# Patient Record
Sex: MALE | Race: WHITE | NOT HISPANIC OR LATINO | Employment: FULL TIME | URBAN - METROPOLITAN AREA
[De-identification: names, ages, dates, MRNs, and addresses within clinical notes are randomized per-mention and may not be internally consistent; named-entity substitution may affect disease eponyms.]

---
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
## 2020-07-31 ENCOUNTER — HOSPITAL ENCOUNTER (EMERGENCY)
Facility: CLINIC | Age: 20
Discharge: HOME | End: 2020-07-31
Attending: NURSE PRACTITIONER | Admitting: NURSE PRACTITIONER
Payer: COMMERCIAL

## 2020-07-31 ENCOUNTER — APPOINTMENT (OUTPATIENT)
Dept: GENERAL RADIOLOGY | Facility: CLINIC | Age: 20
End: 2020-07-31
Attending: NURSE PRACTITIONER
Payer: COMMERCIAL

## 2020-07-31 VITALS
WEIGHT: 145 LBS | TEMPERATURE: 98.3 F | SYSTOLIC BLOOD PRESSURE: 124 MMHG | RESPIRATION RATE: 16 BRPM | HEART RATE: 99 BPM | DIASTOLIC BLOOD PRESSURE: 78 MMHG | OXYGEN SATURATION: 99 % | BODY MASS INDEX: 21.41 KG/M2

## 2020-07-31 DIAGNOSIS — K52.9 NON-SPECIFIC COLITIS: ICD-10-CM

## 2020-07-31 LAB
ALBUMIN SERPL-MCNC: 4.8 G/DL (ref 3.4–5)
ALP SERPL-CCNC: 102 U/L (ref 40–150)
ALT SERPL W P-5'-P-CCNC: 33 U/L (ref 0–70)
ANION GAP SERPL CALCULATED.3IONS-SCNC: 4 MMOL/L (ref 3–14)
AST SERPL W P-5'-P-CCNC: 15 U/L (ref 0–45)
BASOPHILS # BLD AUTO: 0.1 10E9/L (ref 0–0.2)
BASOPHILS NFR BLD AUTO: 0.9 %
BILIRUB SERPL-MCNC: 0.6 MG/DL (ref 0.2–1.3)
BUN SERPL-MCNC: 10 MG/DL (ref 7–30)
CALCIUM SERPL-MCNC: 9.1 MG/DL (ref 8.5–10.1)
CHLORIDE SERPL-SCNC: 106 MMOL/L (ref 94–109)
CO2 SERPL-SCNC: 30 MMOL/L (ref 20–32)
CREAT SERPL-MCNC: 0.8 MG/DL (ref 0.66–1.25)
DIFFERENTIAL METHOD BLD: NORMAL
EOSINOPHIL # BLD AUTO: 0.2 10E9/L (ref 0–0.7)
EOSINOPHIL NFR BLD AUTO: 3.4 %
ERYTHROCYTE [DISTWIDTH] IN BLOOD BY AUTOMATED COUNT: 11.9 % (ref 10–15)
GFR SERPL CREATININE-BSD FRML MDRD: >90 ML/MIN/{1.73_M2}
GLUCOSE SERPL-MCNC: 105 MG/DL (ref 70–99)
HCT VFR BLD AUTO: 46.6 % (ref 40–53)
HEMOCCULT STL QL: YES
HGB BLD-MCNC: 16.6 G/DL (ref 13.3–17.7)
IMM GRANULOCYTES # BLD: 0 10E9/L (ref 0–0.4)
IMM GRANULOCYTES NFR BLD: 0.1 %
INTERNAL QC OK POCT: YES
LYMPHOCYTES # BLD AUTO: 1.9 10E9/L (ref 0.8–5.3)
LYMPHOCYTES NFR BLD AUTO: 28.6 %
MCH RBC QN AUTO: 32.2 PG (ref 26.5–33)
MCHC RBC AUTO-ENTMCNC: 35.6 G/DL (ref 31.5–36.5)
MCV RBC AUTO: 90 FL (ref 78–100)
MONOCYTES # BLD AUTO: 0.5 10E9/L (ref 0–1.3)
MONOCYTES NFR BLD AUTO: 7.9 %
NEUTROPHILS # BLD AUTO: 4 10E9/L (ref 1.6–8.3)
NEUTROPHILS NFR BLD AUTO: 59.1 %
NRBC # BLD AUTO: 0 10*3/UL
NRBC BLD AUTO-RTO: 0 /100
PLATELET # BLD AUTO: 235 10E9/L (ref 150–450)
POTASSIUM SERPL-SCNC: 3.5 MMOL/L (ref 3.4–5.3)
PROT SERPL-MCNC: 7.9 G/DL (ref 6.8–8.8)
RBC # BLD AUTO: 5.16 10E12/L (ref 4.4–5.9)
SODIUM SERPL-SCNC: 140 MMOL/L (ref 133–144)
TEST CARD LOT NUMBER: ABNORMAL
WBC # BLD AUTO: 6.7 10E9/L (ref 4–11)

## 2020-07-31 PROCEDURE — 80053 COMPREHEN METABOLIC PANEL: CPT | Performed by: NURSE PRACTITIONER

## 2020-07-31 PROCEDURE — 74019 RADEX ABDOMEN 2 VIEWS: CPT

## 2020-07-31 PROCEDURE — 99284 EMERGENCY DEPT VISIT MOD MDM: CPT | Performed by: NURSE PRACTITIONER

## 2020-07-31 PROCEDURE — 85025 COMPLETE CBC W/AUTO DIFF WBC: CPT | Performed by: NURSE PRACTITIONER

## 2020-07-31 PROCEDURE — 82272 OCCULT BLD FECES 1-3 TESTS: CPT | Performed by: NURSE PRACTITIONER

## 2020-07-31 PROCEDURE — 99284 EMERGENCY DEPT VISIT MOD MDM: CPT | Mod: Z6 | Performed by: NURSE PRACTITIONER

## 2020-07-31 NOTE — ED AVS SNAPSHOT
[facility]  [address] [facility] [facility] [address]  Phone:  [phone]  Fax:  [phone]                                    Pio Watson   MRN: [id number]    Department:  [facility]   Date of Visit:  7/31/2020           After Visit Summary Signature Page    I have received my discharge instructions, and my questions have been answered. I have discussed any challenges I see with this plan with the nurse or doctor.    ..........................................................................................................................................  Patient/Patient Representative Signature      ..........................................................................................................................................  Patient Representative Print Name and Relationship to Patient    ..................................................               ................................................  Date                                   Time    ..........................................................................................................................................  Reviewed by Signature/Title    ...................................................              ..............................................  Date                                               Time          22EPIC Rev 08/18

## 2020-07-31 NOTE — LETTER
July 31, 2020      To Whom It May Concern:      Pio Watson was seen in our Emergency Department today, 07/31/20.  I expect his condition to improve over the next few days.  He may return to work when improved.    Sincerely,        FELIPA Pal CNP

## 2020-07-31 NOTE — ED PROVIDER NOTES
History     Chief Complaint   Patient presents with     Rectal Bleeding     bright red blood has been constipated     HPI  Pio Watson is a 20 year old male who presents to the emergency department with abdominal pain and bright red rectal bleeding.  Pt reports onset of symptoms was Wednesday.  Pt reports he had [employer] on Wednesday and within 20 minutes developed abdo melonie discomfort since  Patient endorses associative symptoms of nausea and chills; patient thought that perhaps he was constipated and therefore he tried taking a laxative without any change in bowel movements or his symptomatology.    Patient denies fever, aches, sweats, ear pain, eye pain, throat pain, vision changes, chest pain, shortness of air, vomiting, diarrhea, difficulty urinating, dysuria, dizziness, seizures, speech difficulty and thoughts of harming self.  All other systems were reviewed and are negative.    Allergies:  No Known Allergies    Problem List:    Patient Active Problem List    Diagnosis Date Noted     Nonorganic enuresis 10/06/2008     Priority: Medium     Other behavioral problems 08/07/2007     Priority: Medium     Problem list name updated by automated process. 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 Provider to review          Past Medical History:    Past Medical History:   Diagnosis Date     Unspecified hearing loss        Past Surgical History:    No past surgical history on file.    Family History:    Family History   Problem Relation Age of Onset     Depression Mother      Gastrointestinal Disease Mother         Crohn's Disease     Alcohol/Drug Father      Unknown/Adopted Father      Unknown/Adopted Maternal Grandfather      Musculoskeletal Disorder Paternal Grandmother         fibromyalgia     Diabetes Paternal Grandmother         hypoglycemia       Social History:  Marital Status:  Single [1]  Social History      Tobacco Use     Smoking status: Never Smoker     Smokeless tobacco: Never Used     Tobacco comment: outside, car   Substance Use Topics     Alcohol use: No     Drug use: No          Medications:    No current outpatient medications on file.        Review of Systems  As mentioned above in the history present illness. All other systems were reviewed and are negative.    Physical Exam   BP: 121/79  Pulse: 99  Heart Rate: 80  Temp: 98.3  F (36.8  C)  Resp: 16  Weight: 65.8 kg (145 lb)  SpO2: 97 %      Physical Exam  Vitals signs and nursing note reviewed.   Constitutional:       General: He is not in acute distress (mild distress).     Appearance: Normal appearance. He is well-developed. He is not ill-appearing, toxic-appearing or diaphoretic.   HENT:      Head: Normocephalic and atraumatic.      Right Ear: Tympanic membrane, ear canal and external ear normal.      Left Ear: Tympanic membrane, ear canal and external ear normal.      Nose: Nose normal.      Mouth/Throat:      Mouth: Mucous membranes are moist.      Pharynx: Uvula midline. No oropharyngeal exudate or posterior oropharyngeal erythema.   Eyes:      General:         Right eye: No discharge.         Left eye: No discharge.      Conjunctiva/sclera: Conjunctivae normal.   Neck:      Thyroid: No thyromegaly.   Cardiovascular:      Rate and Rhythm: Normal rate and regular rhythm.      Heart sounds: Normal heart sounds. No murmur. No friction rub. No gallop.    Pulmonary:      Effort: Pulmonary effort is normal.      Breath sounds: Normal breath sounds. No stridor. No wheezing.   Abdominal:      General: Abdomen is protuberant. Bowel sounds are normal. There is no distension.      Palpations: Abdomen is soft. There is no mass.      Tenderness: There is generalized abdominal tenderness. There is no right CVA tenderness, left CVA tenderness, guarding or rebound.      Hernia: There is no hernia in the umbilical area or ventral area.   Lymphadenopathy:       Cervical: No cervical adenopathy.   Skin:     General: Skin is warm.      Capillary Refill: Capillary refill takes less than 2 seconds.      Findings: No rash.   Neurological:      General: No focal deficit present.      Mental Status: He is alert and oriented to person, place, and time.   Psychiatric:         Mood and Affect: Mood normal.         Behavior: Behavior is cooperative.         ED Course        Procedures    Results for orders placed or performed during the hospital encounter of 07/31/20 (from the past 24 hour(s))   Occult blood stool POCT   Result Value Ref Range    Occult Blood yes neg    Internal QC OK Yes     Test Card Lot Number [id number]    CBC with platelets differential   Result Value Ref Range    WBC 6.7 4.0 - 11.0 10e9/L    RBC Count 5.16 4.4 - 5.9 10e12/L    Hemoglobin 16.6 13.3 - 17.7 g/dL    Hematocrit 46.6 40.0 - 53.0 %    MCV 90 78 - 100 fl    MCH 32.2 26.5 - 33.0 pg    MCHC 35.6 31.5 - 36.5 g/dL    RDW 11.9 10.0 - 15.0 %    Platelet Count 235 150 - 450 10e9/L    Diff Method Automated Method     % Neutrophils 59.1 %    % Lymphocytes 28.6 %    % Monocytes 7.9 %    % Eosinophils 3.4 %    % Basophils 0.9 %    % Immature Granulocytes 0.1 %    Nucleated RBCs 0 0 /100    Absolute Neutrophil 4.0 1.6 - 8.3 10e9/L    Absolute Lymphocytes 1.9 0.8 - 5.3 10e9/L    Absolute Monocytes 0.5 0.0 - 1.3 10e9/L    Absolute Eosinophils 0.2 0.0 - 0.7 10e9/L    Absolute Basophils 0.1 0.0 - 0.2 10e9/L    Abs Immature Granulocytes 0.0 0 - 0.4 10e9/L    Absolute Nucleated RBC 0.0    Comprehensive metabolic panel   Result Value Ref Range    Sodium 140 133 - 144 mmol/L    Potassium 3.5 3.4 - 5.3 mmol/L    Chloride 106 94 - 109 mmol/L    Carbon Dioxide 30 20 - 32 mmol/L    Anion Gap 4 3 - 14 mmol/L    Glucose 105 (H) 70 - 99 mg/dL    Urea Nitrogen 10 7 - 30 mg/dL    Creatinine 0.80 0.66 - 1.25 mg/dL    GFR Estimate >90 >60 mL/min/[1.73_m2]    GFR Estimate If Black >90 >60 mL/min/[1.73_m2]    Calcium 9.1 8.5 - 10.1 mg/dL     Bilirubin Total 0.6 0.2 - 1.3 mg/dL    Albumin 4.8 3.4 - 5.0 g/dL    Protein Total 7.9 6.8 - 8.8 g/dL    Alkaline Phosphatase 102 40 - 150 U/L    ALT 33 0 - 70 U/L    AST 15 0 - 45 U/L   Abdomen, flat/upright (2 views)    Narrative    XR ABDOMEN 2 VW 7/31/2020 11:05 AM    HISTORY: abdominal pain, bright red rectal bleeding    COMPARISON: None.    FINDINGS: There is a normal bowel gas pattern without evidence for  small bowel obstruction. Normal amount of stool within the colon. No  free intraperitoneal air. No abnormal calcifications.      Impression    IMPRESSION: Unremarkable abdominal x-ray.    TISHA JOHNSON MD       Medications - No data to display    Assessments & Plan (with Medical Decision Making)  Pio Watson is a 20 year old male who presents to the emergency department with abdominal pain and bright red rectal bleeding.  Patient reports that he noted his symptoms after eating [employer] on Wednesday and subsequently has had abdominal pain, cramping, and intermittent bright red rectal bleeding.  Patient has no known history of external or internal hemorrhoids and no history of diverticulitis, ulcerative colitis or Crohn's disease.  Patient does report mother has Crohn's disease but he himself does not.  On examination patient has no signs of acute abdomen and vital signs reveal blood pressure be 124/78, temp is 98.3, heart rate is 80, O2 saturation rate is 99%.  X-ray of the abdomen reveals normal bowel gas pattern with no sign of obstruction or perforation.  CBC completed and no sign of acute leukocytosis or acute blood loss as hemoglobin is 16.6.  Stool guaiac is positive and rectal exam reveals small scant amounts of bright red blood.  Comprehensive metabolic panel is normal.  Reviewed all these findings with patient and recommend a gastroenteritis that can be related to food possibly.  Discussed worrisome reasons with which returned.  Recommend clear liquid to bland diet and follow-up if no  improvement in symptoms or persistent bleeding beyond 7 to 8 days.  Return sooner if worsening pain.  Patient verbalized understanding and discharged in stable condition.     I have reviewed the nursing notes.    I have reviewed the findings, diagnosis, plan and need for follow up with the patient.  There are no discharge medications for this patient.      Final diagnoses:   Non-specific colitis       7/31/2020   [facility] EMERGENCY DEPARTMENT     Octavia Smith APRN CNP  07/31/20 1727

## 2020-07-31 NOTE — ED NOTES
3 days ago had [employer], took some laxatives no results last normal BM on Tuesday, rectal pressure, blood in stool last night, no emesis no diarrhea

## 2020-12-04 ENCOUNTER — VIRTUAL VISIT (OUTPATIENT)
Dept: FAMILY MEDICINE | Facility: OTHER | Age: 20
End: 2020-12-04
Payer: COMMERCIAL

## 2020-12-04 PROCEDURE — 99421 OL DIG E/M SVC 5-10 MIN: CPT | Performed by: PHYSICIAN ASSISTANT

## 2020-12-04 NOTE — PROGRESS NOTES
"Date: 2020 11:42:19  Clinician: Ike Pagan  Clinician NPI: [id number]  Patient: Brennen Watson  Patient : 2000  Patient Address: [address] [id number]  Patient Phone: [phone]  Visit Protocol: URI  Patient Summary:  Brennen is a 20 year old ( : 2000 ) male who initiated a [facility] Visit for COVID-19 (Coronavirus) evaluation and screening. When asked the question \"Please sign me up to receive news, health information and promotions from [facility].\", Brennen responded \"Yes\".    Brennen states his symptoms started gradually 3-4 days ago.   His symptoms consist of wheezing, a cough, nasal congestion, rhinitis, myalgia, malaise, a sore throat, ageusia, diarrhea, and anosmia. Brennen also feels feverish but was unable to measure his temperature.   Symptom details     Nasal secretions: The color of his mucus is clear.    Cough: Brennen coughs a few times an hour and his cough is more bothersome at night. Phlegm comes into his throat when he coughs. He believes his cough is caused by post-nasal drip. The color of the phlegm is clear.     Sore throat: Brennen reports having mild throat pain (1-3 on a 10 point pain scale), does not have exudate on his tonsils, and can swallow liquids. He is not sure if the lymph nodes in his neck are enlarged. A rash has not appeared on the skin since the sore throat started.     Wheezing: Additional wheezing details as reported by the patient (free text): It hasn't really affected me but it is there        Brennen denies having ear pain, headache, nausea, vomiting, facial pain or pressure, chills, and teeth pain. He also denies taking antibiotic medication in the past month, having recent facial or sinus surgery in the past 60 days, and double sickening (worsening symptoms after initial improvement). He is not experiencing dyspnea.   Precipitating events  Within the past week, Brennen has not been exposed to someone with strep throat. He has recently been exposed to " someone with influenza. Brennen has been in close contact with the following high risk individuals: people with asthma, heart disease or diabetes.   Pertinent COVID-19 (Coronavirus) information  Brennen does not work or volunteer as healthcare worker or a . In the past 14 days, Brennen has not worked or volunteered at a healthcare facility or group living setting.   In the past 14 days, he also has not lived in a congregate living setting.   Brennen has had a close contact with a laboratory-confirmed COVID-19 patient within 14 days of symptom onset. He was exposed at his work. Date Brennen was exposed to the laboratory-confirmed COVID-19 patient: 2000   Additional information about contact with COVID-19 (Coronavirus) patient as reported by the patient (free text): My co-worker wasn't feeling well at work. He was saying he lost his taste and couldn't smell. And now I have very similar symptoms he has    Since December 2019, Brennen has not been tested for COVID-19 and has had upper respiratory infection (URI) or influenza-like illness.      Date(s) of previous URI or influenza-like illness (free-text): Last year around this time     Symptoms Brennen experienced during previous URI or influenza-like illness as reported by the patient (free-text): Fever, Runny nose, chills.        Pertinent medical history  He has not been told by his provider to avoid NSAIDs.   Brennen does not have diabetes. He denies having immunosuppressive conditions (e.g., chemotherapy, HIV, organ transplant, long-term use of steroids or other immunosuppressive medications, splenectomy). He does not have severe COPD and congestive heart failure. He does not have asthma.   Brennen needs a return to work/school note.   Weight: 145 lbs   Brennen smokes or uses smokeless tobacco.   Weight: 145 lbs    MEDICATIONS: No current medications, ALLERGIES: NKDA  Clinician Response:  Dear Brennen,   Based on your exposure to COVID-19 (coronavirus), we would like to test  you for this virus.  1. Please call [phone] to schedule your visit. Explain that you were referred by [facility] to have a COVID-19 test. Be ready to share your [facility] visit ID number.  * If you need to schedule in [facility] please call [phone] or for [employer] employees please call [phone].   * If you need to schedule in the [place] area please call [phone]. [place] employees call [phone].   The following will serve as your written order for this COVID Test, ordered by me, for the indication of suspected COVID [Z20.828]: The test will be ordered in [employer], our electronic health record, after you are scheduled. It will show as ordered and authorized by Judson Yancey MD.  Order: COVID-19 (coronavirus) PCR for ASYMPTOMATIC EXPOSURE testing from [facility].   If you know you have had close contact with someone who tested positive, you should be quarantined for 14 days after this exposure. You should stay in quarantine for the14 days even if the covid test is negative, the optimal time to test after exposure is 5-7 days from the exposure  Quarantine means   What should I do?  For safety, it's very important to follow these rules. Do this for 14 days after the date you were last exposed to the virus..  Stay home and away from others. Don't go to school or anywhere else. Generally quarantine means staying home from work but there are some exceptions to this. Please contact your workplace.   No hugging, kissing or shaking hands.  Don't let anyone visit.  Cover your mouth and nose with a mask, tissue or washcloth to avoid spreading germs.  Wash your hands and face often. Use soap and water.  What are the symptoms of COVID-19?  The most common symptoms are cough, fever and trouble breathing. Less common symptoms include headache, body aches, fatigue (feeling very tired), chills, sore throat, stuffy or runny nose, diarrhea (loose poop), loss of taste or smell, belly pain, and nausea or vomiting (feeling sick to  your stomach or throwing up).  After 14 days, if you have still don't have symptoms, you likely don't have this virus.  If you develop symptoms, follow these guidelines.  If you're normally healthy: Please start another [facility] visit to report your symptoms. Go to [email or web address].  If you have a serious health problem (like cancer, heart failure, an organ transplant or kidney disease): Call your specialty clinic. Let them know that you might have COVID-19.  2. When it's time for your COVID test:  Stay at least 6 feet away from others. (If someone will drive you to your test, stay in the backseat, as far away from the  as you can.)  Cover your mouth and nose with a mask, tissue or washcloth.  Go straight to the testing site. Don't make any stops on the way there or back.  Please note  Caregivers in these groups are at risk for severe illness due to COVID-19:  o People 65 years and older  o People who live in a nursing home or long-term care facility  o People with chronic disease (lung, heart, cancer, diabetes, kidney, liver, immunologic)  o People who have a weakened immune system, including those who:  Are in cancer treatment  Take medicine that weakens the immune system, such as corticosteroids  Had a bone marrow or organ transplant  Have an immune deficiency  Have poorly controlled HIV or AIDS  Are obese (body mass index of 40 or higher)  Smoke regularly  Where can I get more information?  [facility] -- About COVID-19: [email or web address]  CDC -- What to Do If You're Sick: www.cdc.gov/coronavirus/2019-ncov/about/steps-when-sick.html  CDC -- Ending Home Isolation: www.cdc.gov/coronavirus/2019-ncov/hcp/disposition-in-home-patients.html  CDC -- Caring for Someone: www.cdc.gov/coronavirus/2019-ncov/if-you-are-sick/care-for-someone.html  [facility] -- Interim Guidance for Hospital Discharge to Home: www.health.[facility].[place]/diseases/coronavirus/hcp/hospdischarge.pdf  [facility] clinical trials  (COVID-19 research studies): [email or web address].[email or web address]-clinical-trials  Below are the COVID-19 hotlines at the [place] Department of Health ([facility]). Interpreters are available.  For health questions: Call 992-110-0398 or 1-119.928.1396 (7 a.m. to 7 p.m.)  For questions about schools and childcare: Call 224-993-0429 or 1-886.312.9136 (7 a.m. to 7 p.m.)    Diagnosis: Contact with and (suspected) exposure to other viral communicable diseases  Diagnosis ICD: Z20.828

## 2020-12-05 DIAGNOSIS — Z20.822 ENCOUNTER FOR LABORATORY TESTING FOR COVID-19 VIRUS: Primary | ICD-10-CM

## 2020-12-05 PROCEDURE — U0003 INFECTIOUS AGENT DETECTION BY NUCLEIC ACID (DNA OR RNA); SEVERE ACUTE RESPIRATORY SYNDROME CORONAVIRUS 2 (SARS-COV-2) (CORONAVIRUS DISEASE [COVID-19]), AMPLIFIED PROBE TECHNIQUE, MAKING USE OF HIGH THROUGHPUT TECHNOLOGIES AS DESCRIBED BY CMS-2020-01-R: HCPCS | Performed by: FAMILY MEDICINE

## 2020-12-06 ENCOUNTER — HEALTH MAINTENANCE LETTER (OUTPATIENT)
Age: 20
End: 2020-12-06

## 2020-12-06 LAB
SARS-COV-2 RNA SPEC QL NAA+PROBE: ABNORMAL
SPECIMEN SOURCE: ABNORMAL

## 2021-09-25 ENCOUNTER — HEALTH MAINTENANCE LETTER (OUTPATIENT)
Age: 21
End: 2021-09-25

## 2022-01-15 ENCOUNTER — HEALTH MAINTENANCE LETTER (OUTPATIENT)
Age: 22
End: 2022-01-15

## 2023-01-07 ENCOUNTER — HEALTH MAINTENANCE LETTER (OUTPATIENT)
Age: 23
End: 2023-01-07

## 2023-04-22 ENCOUNTER — HEALTH MAINTENANCE LETTER (OUTPATIENT)
Age: 23
End: 2023-04-22

## 2023-06-08 ENCOUNTER — APPOINTMENT (OUTPATIENT)
Dept: GENERAL RADIOLOGY | Facility: CLINIC | Age: 23
End: 2023-06-08
Attending: EMERGENCY MEDICINE
Payer: OTHER MISCELLANEOUS

## 2023-06-08 ENCOUNTER — HOSPITAL ENCOUNTER (EMERGENCY)
Facility: CLINIC | Age: 23
Discharge: HOME | End: 2023-06-09
Attending: EMERGENCY MEDICINE | Admitting: EMERGENCY MEDICINE
Payer: OTHER MISCELLANEOUS

## 2023-06-08 DIAGNOSIS — S61.214A LACERATION OF RIGHT RING FINGER WITHOUT FOREIGN BODY WITHOUT DAMAGE TO NAIL, INITIAL ENCOUNTER: ICD-10-CM

## 2023-06-08 PROCEDURE — 99283 EMERGENCY DEPT VISIT LOW MDM: CPT | Mod: 25 | Performed by: EMERGENCY MEDICINE

## 2023-06-08 PROCEDURE — 12002 RPR S/N/AX/GEN/TRNK2.6-7.5CM: CPT | Performed by: EMERGENCY MEDICINE

## 2023-06-08 PROCEDURE — 73140 X-RAY EXAM OF FINGER(S): CPT | Mod: RT

## 2023-06-08 PROCEDURE — 250N000011 HC RX IP 250 OP 636: Performed by: EMERGENCY MEDICINE

## 2023-06-08 PROCEDURE — 90715 TDAP VACCINE 7 YRS/> IM: CPT | Performed by: EMERGENCY MEDICINE

## 2023-06-08 PROCEDURE — 90471 IMMUNIZATION ADMIN: CPT | Performed by: EMERGENCY MEDICINE

## 2023-06-08 RX ADMIN — CLOSTRIDIUM TETANI TOXOID ANTIGEN (FORMALDEHYDE INACTIVATED), CORYNEBACTERIUM DIPHTHERIAE TOXOID ANTIGEN (FORMALDEHYDE INACTIVATED), BORDETELLA PERTUSSIS TOXOID ANTIGEN (GLUTARALDEHYDE INACTIVATED), BORDETELLA PERTUSSIS FILAMENTOUS HEMAGGLUTININ ANTIGEN (FORMALDEHYDE INACTIVATED), BORDETELLA PERTUSSIS PERTACTIN ANTIGEN, AND BORDETELLA PERTUSSIS FIMBRIAE 2/3 ANTIGEN 0.5 ML: 5; 2; 2.5; 5; 3; 5 INJECTION, SUSPENSION INTRAMUSCULAR at 23:55

## 2023-06-09 ENCOUNTER — TELEPHONE (OUTPATIENT)
Dept: FAMILY MEDICINE | Facility: CLINIC | Age: 23
End: 2023-06-09

## 2023-06-09 VITALS
TEMPERATURE: 99 F | HEART RATE: 87 BPM | HEIGHT: 70 IN | RESPIRATION RATE: 16 BRPM | DIASTOLIC BLOOD PRESSURE: 84 MMHG | OXYGEN SATURATION: 97 % | SYSTOLIC BLOOD PRESSURE: 136 MMHG | BODY MASS INDEX: 22.19 KG/M2 | WEIGHT: 155 LBS

## 2023-06-09 RX ORDER — CEPHALEXIN 500 MG/1
500 CAPSULE ORAL 4 TIMES DAILY
Qty: 40 CAPSULE | Refills: 0 | Status: SHIPPED | OUTPATIENT
Start: 2023-06-09

## 2023-06-09 NOTE — TELEPHONE ENCOUNTER
Forms/Letter Request    Type of form/letter: Work    Have you been seen for this request: Yes In ER 6/8/2023    Do we have the form/letter: No    Who is the form from? Work Patient is missing work today 6/9/2023 due to finger laceration.     When is form/letter needed by: n/a    How would you like the form/letter returned: [facility]    Patient Notified form requests are processed in 3-5 business days:Yes    Could we send this information to you in [facility] or would you prefer to receive a phone call?:   Patient would prefer a phone call   Okay to leave a detailed message?: Yes at Home number on file [phone] (home)

## 2023-06-09 NOTE — LETTER
June 12, 2023      Pio Watson  [address] [address]        To Whom It May Concern:    Pio Watson was seen in the emergency department and missed work 6/8/23.     Sincerely,      FELIPA Quezada CNP

## 2023-06-09 NOTE — ED PROVIDER NOTES
History     Chief Complaint   Patient presents with     Hand Injury     Right ring finger caught in injection mold- laceration     HPI  Pio Watson is a 23 year old male who is right-hand dominant, presenting the emergency department with right ring finger injury.  On the volar aspect, patient had hand caught in injection bold press, causing laceration over the palmar surface.  No injury elsewhere.  Does have right ring finger injury from years ago.  Has permanent/old deviation of the right small finger.  No other injury.  Tetanus updated today in the emergency department    Allergies:  No Known Allergies    Problem List:    Patient Active Problem List    Diagnosis Date Noted     Nonorganic enuresis 10/06/2008     Priority: Medium     Other behavioral problems 08/07/2007     Priority: Medium     Problem list name updated by automated process. 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 Provider to review          Past Medical History:    Past Medical History:   Diagnosis Date     Unspecified hearing loss        Past Surgical History:    No past surgical history on file.    Family History:    Family History   Problem Relation Age of Onset     Depression Mother      Gastrointestinal Disease Mother         Crohn's Disease     Alcohol/Drug Father      Unknown/Adopted Father      Unknown/Adopted Maternal Grandfather      Musculoskeletal Disorder Paternal Grandmother         fibromyalgia     Diabetes Paternal Grandmother         hypoglycemia       Social History:  Marital Status:  Single [1]  Social History     Tobacco Use     Smoking status: Never     Smokeless tobacco: Never     Tobacco comments:     outside, car   Substance Use Topics     Alcohol use: No     Drug use: No        Medications:    cephALEXin (KEFLEX) 500 MG capsule          Review of Systems  See HPI  Physical Exam   BP: (!) 137/96  Pulse: 91  Temp: 99  F  "(37.2  C)  Resp: 16  Height: 177.8 cm (5' 10\")  Weight: 70.3 kg (155 lb)  SpO2: 98 %      Physical Exam  BP (!) 137/96   Pulse 91   Temp 99  F (37.2  C) (Oral)   Resp 16   Ht 1.778 m (5' 10\")   Wt 70.3 kg (155 lb)   SpO2 98%   BMI 22.24 kg/m    General: alert and in no acute distress  Head: atraumatic, normocephalic  Abd: nondistended  Musculoskel/Extremities: Right hand with chronic appearing change of right small finger with ulnar deviation of the PCP.  The fourth finger of the right hand has approximately 4 cm laceration over the volar aspect.  No trouble with range of motion and normal flexion at the DIP and PIP.  Skin: no rashes, no diaphoresis and skin color normal  Neuro: Patient awake, alert, oriented, speech is fluent, gait is normal  Psychiatric: affect/mood normal, cooperative, normal judgement/insight and memory intact      ED Course                 Procedures  [facility] Procedure Note        Laceration Repair:    Performed by: Reuben Jules MD  Authorized by: Reuben Jules MD  Consent given by: Patient who states understanding of the procedure being performed after discussing the risks, benefits and alternatives.    Preparation: Patient was prepped and draped in usual sterile fashion.  Irrigation solution: saline    Body area:right ring finer  Laceration length: 4cm  Contamination: The wound is not contaminated.  Foreign bodies:none  Tendon involvement: none  Anesthesia: Digital block  Local anesthetic: Bupivacaine 0.5%  Anesthetic total: 4ml    Debridement: none  Skin closure: Closed with with 9 x  4.0 Vicryl Sutures  Technique: interrupted  Approximation: close  Approximation difficulty: simple    Patient tolerance: Patient tolerated the procedure well with no immediate complications.              Critical Care time:  none               Results for orders placed or performed during the hospital encounter of 06/08/23 (from the past 24 hour(s))   XR Finger Right G/E 2 Views    " Narrative    EXAM: XR FINGER RIGHT G/E 2 VIEWS  LOCATION: [facility]  DATE/TIME: 6/8/2023 11:55 PM CDT    INDICATION: Caught in injection mold. Pain, trauma.  COMPARISON: None.      Impression    IMPRESSION: Lateral deviation of the 5th digit at the PIP joint. Associated soft tissue swelling and mild deformity. No fracture or dislocation.         Medications   Tdap (tetanus-diphtheria-acell pertussis) (ADACEL) injection 0.5 mL (0.5 mLs Intramuscular $Given 6/8/23 0682)       Assessments & Plan (with Medical Decision Making)  23 year old male, right-hand-dominant, presenting the emergency department after hand was caught in injection mold.  Patient was wearing gloves.  Suffered laceration.  X-ray images are performed.  X-ray images are personally reviewed, and no evidence of fracture based on my interpretation of x-ray.  Laceration was cleansed, and repaired as documented above.  Absorbable sutures were utilized.  I did prescribe Keflex and patient to start this in the event any redness develops given his work in an overall dirty environment.  Instructed on wound care and follow-up in clinic if any signs of infection develop.     I have reviewed the nursing notes.    I have reviewed the findings, diagnosis, plan and need for follow up with the patient.               New Prescriptions    CEPHALEXIN (KEFLEX) 500 MG CAPSULE    Take 1 capsule (500 mg) by mouth 4 times daily       Final diagnoses:   Laceration of right ring finger without foreign body without damage to nail, initial encounter       6/8/2023   [facility] EMERGENCY DEPT     Reuben Jules MD  06/09/23 0035

## 2023-06-09 NOTE — TELEPHONE ENCOUNTER
06-08-23 ED visit, work related finger lac/ repair. S  States mentioned needing note for anticipated missed work today but did not get. States is , lac on dominant hand. Next work day scheduled  06-13-23, plans to return to work without restrictions.  Pt requesting work excuse note from primary care for missed work today. Last OV 11-18-16 Rocio Tracy.  Forwarded to Rocio for review, recommendations.  Advised may need appt as this is work comp and has not seen primary care for some time.   If note approved, fax to [phone], estelle Ashraf.  SUSSY Lucia RN

## 2023-06-09 NOTE — DISCHARGE INSTRUCTIONS
Be seen if any signs of infection develop such as redness, pus, swelling.    Begin antibiotics if signs of infection develop, and would recommend follow-up in clinic if that does occur.    Dissolving stitches were utilized.  9 stitches will disappear on their own.

## 2023-06-12 NOTE — TELEPHONE ENCOUNTER
I sent a letter via [employer] that he was seen in the ED and missed work on that day. If he needs something else, he should be seen    FELIPA Quezada CNP

## 2023-06-14 ENCOUNTER — OFFICE VISIT (OUTPATIENT)
Dept: FAMILY MEDICINE | Facility: CLINIC | Age: 23
End: 2023-06-14

## 2023-06-14 VITALS
RESPIRATION RATE: 16 BRPM | OXYGEN SATURATION: 96 % | HEART RATE: 90 BPM | SYSTOLIC BLOOD PRESSURE: 120 MMHG | HEIGHT: 70 IN | DIASTOLIC BLOOD PRESSURE: 80 MMHG | WEIGHT: 153 LBS | TEMPERATURE: 97.9 F | BODY MASS INDEX: 21.9 KG/M2

## 2023-06-14 DIAGNOSIS — S61.214D LACERATION OF RIGHT RING FINGER WITHOUT FOREIGN BODY WITHOUT DAMAGE TO NAIL, SUBSEQUENT ENCOUNTER: Primary | ICD-10-CM

## 2023-06-14 PROCEDURE — 99024 POSTOP FOLLOW-UP VISIT: CPT | Performed by: NURSE PRACTITIONER

## 2023-06-14 ASSESSMENT — PAIN SCALES - GENERAL: PAINLEVEL: MODERATE PAIN (4)

## 2023-06-14 NOTE — PROGRESS NOTES
"  Assessment & Plan     Laceration of right ring finger without foreign body without damage to nail, subsequent encounter  Healing laceration.  Some concern for infection however Pio reports symptoms unchanged, recommend starting prescribed antibiotics. Note provided with restrictions for work. Recheck in 1 week if symptoms continuing to need restrictions.        MED REC REQUIRED  Post Medication Reconciliation Status: discharge medications reconciled, continue medications without change    FELIPA Quezada CNP  [facility]    Dipti Suero is a 23 year old, presenting for the following health issues:  ER F/U        6/14/2023     1:41 PM   Additional Questions   Roomed by Emily VILLAGRAN     ED/UC Followup:    Facility:  [facility]  Date of visit: 6/8/2023  Reason for visit: Laceration to right ring finger   Current Status: pt state was not given any restrictions for ED. Is unable to push, pull or lift with right hand.   Did not start taking Keflex.       Review of Systems   Constitutional, HEENT, cardiovascular, pulmonary, gi and gu systems are negative, except as otherwise noted.      Objective    /80 (Cuff Size: Adult Regular)   Pulse 90   Temp 97.9  F (36.6  C) (Tympanic)   Resp 16   Ht 1.778 m (5' 10\")   Wt 69.4 kg (153 lb)   SpO2 96%   BMI 21.95 kg/m    Body mass index is 21.95 kg/m .  Physical Exam   GENERAL: healthy, alert and no distress  SKIN: Healing incision right ring finger with swelling, warmth and erythema present, tenderness present lateral right ring finger, range of motion limited at DIP joint  PSYCH: mentation appears normal, affect normal/bright            "

## 2023-06-14 NOTE — LETTER
June 14, 2023      Pio Watson  [address]  [place] [address]        To Whom It May Concern:    Pio Watson was seen on 6/14/23.  Please excuse him due to follow up from work related injury.        Sincerely,        FELIPA Quezada CNP

## 2023-06-14 NOTE — LETTER
June 14, 2023      Pio Watson  [address]  [place] [facility] [address]        To Whom It May Concern:    Pio Watson was seen in our clinic. He may return to work with the following: limited to light duty - No use of right ring finger until 6/22/23 recheck.       Sincerely,        FELIPA Quezada CNP

## 2023-06-22 ENCOUNTER — OFFICE VISIT (OUTPATIENT)
Dept: FAMILY MEDICINE | Facility: CLINIC | Age: 23
End: 2023-06-22
Payer: OTHER MISCELLANEOUS

## 2023-06-22 VITALS
OXYGEN SATURATION: 96 % | DIASTOLIC BLOOD PRESSURE: 80 MMHG | TEMPERATURE: 97 F | BODY MASS INDEX: 21.73 KG/M2 | RESPIRATION RATE: 22 BRPM | WEIGHT: 151.8 LBS | SYSTOLIC BLOOD PRESSURE: 106 MMHG | HEIGHT: 70 IN | HEART RATE: 84 BPM

## 2023-06-22 DIAGNOSIS — S61.214D LACERATION OF RIGHT RING FINGER WITHOUT FOREIGN BODY WITHOUT DAMAGE TO NAIL, SUBSEQUENT ENCOUNTER: Primary | ICD-10-CM

## 2023-06-22 PROCEDURE — 99212 OFFICE O/P EST SF 10 MIN: CPT | Performed by: NURSE PRACTITIONER

## 2023-06-22 ASSESSMENT — PAIN SCALES - GENERAL: PAINLEVEL: MILD PAIN (2)

## 2023-06-22 NOTE — PROGRESS NOTES
"  Assessment & Plan     Laceration of right ring finger without foreign body without damage to nail, subsequent encounter  Having some nerve pain at distal end of laceration otherwise wound healing well.  Plan to monitor at this time.  Note provided to return to work without restrictions.       FELIPA Quezada CNP  [facility]    Dipti Suero is a 23 year old, presenting for the following health issues:  Work Comp (Laceration of right ring finger.  Has been taking Cephalexin.  He states the last stitch by his knuckle is very sore to touch or if he hits it on something.  Otherwise feeling much better. )        6/22/2023    11:06 AM   Additional Questions   Roomed by Chayo HILTON     History of Present Illness       Reason for visit:  Injury to ring finger  Symptom onset:  1-2 weeks ago    He eats 0-1 servings of fruits and vegetables daily.He consumes 2 sweetened beverage(s) daily.He exercises with enough effort to increase his heart rate 10 to 19 minutes per day.  He exercises with enough effort to increase his heart rate 3 or less days per week.   He is taking medications regularly.       Chief Complaint   Patient presents with     Work Comp     Laceration of right ring finger.  Has been taking Cephalexin.  He states the last stitch by his knuckle is very sore to touch or if he hits it on something.  Otherwise feeling much better.      Burning when stitch is moved    Review of Systems   Constitutional, HEENT, cardiovascular, pulmonary, gi and gu systems are negative, except as otherwise noted.      Objective    /80   Pulse 84   Temp 97  F (36.1  C) (Tympanic)   Resp 22   Ht 1.778 m (5' 10\")   Wt 68.9 kg (151 lb 12.8 oz)   SpO2 96%   BMI 21.78 kg/m    Body mass index is 21.78 kg/m .  Physical Exam   GENERAL: healthy, alert and no distress  SKIN: healed laceration right ring finger  PSYCH: mentation appears normal, affect normal/bright                "

## 2023-06-22 NOTE — LETTER
June 22, 2023      Pio Watson  [address]  [place] [address]        To Whom It May Concern:    Pio Watson was seen in our clinic. He may return to work without restrictions 6/23/23.      Sincerely,        FELIPA Quezada CNP

## 2024-06-29 ENCOUNTER — HEALTH MAINTENANCE LETTER (OUTPATIENT)
Age: 24
End: 2024-06-29

## 2024-07-12 NOTE — DISCHARGE INSTRUCTIONS
I recommend a bland diet and plenty of clear liquids until your symptoms are completely resolved.  Then slowly progress to a regular diet.  Return if you should develop lightheadedness, dizziness worsening abdominal pain.       Patient has safety precautions in place bed in the lowest position, bed alarm on, and call light within reach. Plan of care ongoing. No further concerns as of present.    Problem: Patient Centered Care  Goal: Patient preferences are identified and integrated in the patient's plan of care  Description: Interventions:    - Provide timely, complete, and accurate information to patient/family  - Incorporate patient and family knowledge, values, beliefs, and cultural backgrounds into the planning and delivery of care  - Encourage patient/family to participate in care and decision-making at the level they choose  - Honor patient and family perspectives and choices  Outcome: Progressing  Problem: PAIN - ADULT  Goal: Verbalizes/displays adequate comfort level or patient's stated pain goal  Description: INTERVENTIONS:  - Encourage pt to monitor pain and request assistance  - Assess pain using appropriate pain scale  - Administer analgesics based on type and severity of pain and evaluate response  - Implement non-pharmacological measures as appropriate and evaluate response  - Consider cultural and social influences on pain and pain management  - Manage/alleviate anxiety  - Utilize distraction and/or relaxation techniques  - Monitor for opioid side effects  - Notify MD/LIP if interventions unsuccessful or patient reports new pain  - Anticipate increased pain with activity and pre-medicate as appropriate  Outcome: Progressing     Problem: RISK FOR INFECTION - ADULT  Goal: Absence of fever/infection during anticipated neutropenic period  Description: INTERVENTIONS  - Monitor WBC  - Administer growth factors as ordered  - Implement neutropenic guidelines  Outcome: Progressing     Problem: SAFETY ADULT - FALL  Goal: Free from fall injury  Description: INTERVENTIONS:  - Assess pt frequently for physical needs  - Identify cognitive and physical deficits and behaviors that affect risk of falls.  - Hailey fall precautions as  indicated by assessment.  - Educate pt/family on patient safety including physical limitations  - Instruct pt to call for assistance with activity based on assessment  - Modify environment to reduce risk of injury  - Provide assistive devices as appropriate  - Consider OT/PT consult to assist with strengthening/mobility  - Encourage toileting schedule  Outcome: Progressing     Problem: DISCHARGE PLANNING  Goal: Discharge to home or other facility with appropriate resources  Description: INTERVENTIONS:  - Identify barriers to discharge w/pt and caregiver  - Include patient/family/discharge partner in discharge planning  - Arrange for needed discharge resources and transportation as appropriate  - Identify discharge learning needs (meds, wound care, etc)  - Arrange for interpreters to assist at discharge as needed  - Consider post-discharge preferences of patient/family/discharge partner  - Complete POLST form as appropriate  - Assess patient's ability to be responsible for managing their own health  - Refer to Case Management Department for coordinating discharge planning if the patient needs post-hospital services based on physician/LIP order or complex needs related to functional status, cognitive ability or social support system  Outcome: Progressing

## 2025-05-15 ENCOUNTER — NURSE TRIAGE (OUTPATIENT)
Dept: FAMILY MEDICINE | Facility: CLINIC | Age: 25
End: 2025-05-15

## 2025-05-15 ENCOUNTER — APPOINTMENT (OUTPATIENT)
Dept: CT IMAGING | Facility: CLINIC | Age: 25
End: 2025-05-15
Attending: EMERGENCY MEDICINE
Payer: COMMERCIAL

## 2025-05-15 ENCOUNTER — HOSPITAL ENCOUNTER (EMERGENCY)
Facility: CLINIC | Age: 25
Discharge: HOME | End: 2025-05-15
Attending: EMERGENCY MEDICINE | Admitting: EMERGENCY MEDICINE
Payer: COMMERCIAL

## 2025-05-15 ENCOUNTER — APPOINTMENT (OUTPATIENT)
Dept: MRI IMAGING | Facility: CLINIC | Age: 25
End: 2025-05-15
Attending: EMERGENCY MEDICINE
Payer: COMMERCIAL

## 2025-05-15 VITALS
TEMPERATURE: 98.4 F | SYSTOLIC BLOOD PRESSURE: 128 MMHG | WEIGHT: 159 LBS | DIASTOLIC BLOOD PRESSURE: 85 MMHG | OXYGEN SATURATION: 98 % | HEIGHT: 69 IN | BODY MASS INDEX: 23.55 KG/M2 | RESPIRATION RATE: 6 BRPM | HEART RATE: 82 BPM

## 2025-05-15 DIAGNOSIS — R20.0 NUMBNESS: ICD-10-CM

## 2025-05-15 LAB
ANION GAP SERPL CALCULATED.3IONS-SCNC: 12 MMOL/L (ref 7–15)
APTT PPP: 27 SECONDS (ref 22–38)
ATRIAL RATE - MUSE: 76 BPM
BASOPHILS # BLD AUTO: 0.1 10E3/UL (ref 0–0.2)
BASOPHILS NFR BLD AUTO: 1 %
BUN SERPL-MCNC: 9.9 MG/DL (ref 6–20)
CALCIUM SERPL-MCNC: 9.6 MG/DL (ref 8.8–10.4)
CHLORIDE SERPL-SCNC: 105 MMOL/L (ref 98–107)
CREAT SERPL-MCNC: 0.92 MG/DL (ref 0.67–1.17)
DIASTOLIC BLOOD PRESSURE - MUSE: NORMAL MMHG
EGFRCR SERPLBLD CKD-EPI 2021: >90 ML/MIN/1.73M2
EOSINOPHIL # BLD AUTO: 0.1 10E3/UL (ref 0–0.7)
EOSINOPHIL NFR BLD AUTO: 2 %
ERYTHROCYTE [DISTWIDTH] IN BLOOD BY AUTOMATED COUNT: 11.7 % (ref 10–15)
GLUCOSE BLDC GLUCOMTR-MCNC: 96 MG/DL (ref 70–99)
GLUCOSE SERPL-MCNC: 91 MG/DL (ref 70–99)
HCO3 SERPL-SCNC: 25 MMOL/L (ref 22–29)
HCT VFR BLD AUTO: 40.8 % (ref 40–53)
HGB BLD-MCNC: 14.8 G/DL (ref 13.3–17.7)
IMM GRANULOCYTES # BLD: 0 10E3/UL
IMM GRANULOCYTES NFR BLD: 0 %
INR PPP: 1.05 (ref 0.85–1.15)
INTERPRETATION ECG - MUSE: NORMAL
LYMPHOCYTES # BLD AUTO: 1.6 10E3/UL (ref 0.8–5.3)
LYMPHOCYTES NFR BLD AUTO: 24 %
MCH RBC QN AUTO: 32 PG (ref 26.5–33)
MCHC RBC AUTO-ENTMCNC: 36.3 G/DL (ref 31.5–36.5)
MCV RBC AUTO: 88 FL (ref 78–100)
MONOCYTES # BLD AUTO: 0.5 10E3/UL (ref 0–1.3)
MONOCYTES NFR BLD AUTO: 7 %
NEUTROPHILS # BLD AUTO: 4.4 10E3/UL (ref 1.6–8.3)
NEUTROPHILS NFR BLD AUTO: 65 %
NRBC # BLD AUTO: 0 10E3/UL
NRBC BLD AUTO-RTO: 0 /100
P AXIS - MUSE: 47 DEGREES
PLATELET # BLD AUTO: 236 10E3/UL (ref 150–450)
POTASSIUM SERPL-SCNC: 4 MMOL/L (ref 3.4–5.3)
PR INTERVAL - MUSE: 176 MS
PROTHROMBIN TIME: 13.6 SECONDS (ref 11.8–14.8)
QRS DURATION - MUSE: 96 MS
QT - MUSE: 358 MS
QTC - MUSE: 402 MS
R AXIS - MUSE: 45 DEGREES
RBC # BLD AUTO: 4.63 10E6/UL (ref 4.4–5.9)
SODIUM SERPL-SCNC: 142 MMOL/L (ref 135–145)
SYSTOLIC BLOOD PRESSURE - MUSE: NORMAL MMHG
T AXIS - MUSE: 43 DEGREES
TROPONIN T SERPL HS-MCNC: <6 NG/L
VENTRICULAR RATE- MUSE: 76 BPM
WBC # BLD AUTO: 6.7 10E3/UL (ref 4–11)

## 2025-05-15 PROCEDURE — 99285 EMERGENCY DEPT VISIT HI MDM: CPT | Mod: 25 | Performed by: EMERGENCY MEDICINE

## 2025-05-15 PROCEDURE — 85610 PROTHROMBIN TIME: CPT | Performed by: EMERGENCY MEDICINE

## 2025-05-15 PROCEDURE — 36415 COLL VENOUS BLD VENIPUNCTURE: CPT | Performed by: EMERGENCY MEDICINE

## 2025-05-15 PROCEDURE — 255N000002 HC RX 255 OP 636: Performed by: EMERGENCY MEDICINE

## 2025-05-15 PROCEDURE — 85004 AUTOMATED DIFF WBC COUNT: CPT | Performed by: EMERGENCY MEDICINE

## 2025-05-15 PROCEDURE — 99285 EMERGENCY DEPT VISIT HI MDM: CPT | Performed by: EMERGENCY MEDICINE

## 2025-05-15 PROCEDURE — 80048 BASIC METABOLIC PNL TOTAL CA: CPT | Performed by: EMERGENCY MEDICINE

## 2025-05-15 PROCEDURE — 93005 ELECTROCARDIOGRAM TRACING: CPT | Performed by: EMERGENCY MEDICINE

## 2025-05-15 PROCEDURE — 250N000011 HC RX IP 250 OP 636: Performed by: EMERGENCY MEDICINE

## 2025-05-15 PROCEDURE — 93010 ELECTROCARDIOGRAM REPORT: CPT | Performed by: EMERGENCY MEDICINE

## 2025-05-15 PROCEDURE — 82962 GLUCOSE BLOOD TEST: CPT

## 2025-05-15 PROCEDURE — 70450 CT HEAD/BRAIN W/O DYE: CPT | Mod: XU

## 2025-05-15 PROCEDURE — 96360 HYDRATION IV INFUSION INIT: CPT | Mod: 59 | Performed by: EMERGENCY MEDICINE

## 2025-05-15 PROCEDURE — 258N000003 HC RX IP 258 OP 636: Performed by: EMERGENCY MEDICINE

## 2025-05-15 PROCEDURE — 70553 MRI BRAIN STEM W/O & W/DYE: CPT

## 2025-05-15 PROCEDURE — 250N000009 HC RX 250: Performed by: EMERGENCY MEDICINE

## 2025-05-15 PROCEDURE — 70498 CT ANGIOGRAPHY NECK: CPT

## 2025-05-15 PROCEDURE — A9585 GADOBUTROL INJECTION: HCPCS | Performed by: EMERGENCY MEDICINE

## 2025-05-15 PROCEDURE — 96361 HYDRATE IV INFUSION ADD-ON: CPT | Performed by: EMERGENCY MEDICINE

## 2025-05-15 PROCEDURE — 85730 THROMBOPLASTIN TIME PARTIAL: CPT | Performed by: EMERGENCY MEDICINE

## 2025-05-15 PROCEDURE — 84484 ASSAY OF TROPONIN QUANT: CPT | Performed by: EMERGENCY MEDICINE

## 2025-05-15 RX ORDER — IOPAMIDOL 755 MG/ML
67 INJECTION, SOLUTION INTRAVASCULAR ONCE
Status: COMPLETED | OUTPATIENT
Start: 2025-05-15 | End: 2025-05-15

## 2025-05-15 RX ORDER — GADOBUTROL 604.72 MG/ML
7 INJECTION INTRAVENOUS ONCE
Status: COMPLETED | OUTPATIENT
Start: 2025-05-15 | End: 2025-05-15

## 2025-05-15 RX ADMIN — SODIUM CHLORIDE 100 ML: 9 INJECTION, SOLUTION INTRAVENOUS at 20:18

## 2025-05-15 RX ADMIN — IOPAMIDOL 67 ML: 755 INJECTION, SOLUTION INTRAVENOUS at 20:17

## 2025-05-15 RX ADMIN — SODIUM CHLORIDE 500 ML: 0.9 INJECTION, SOLUTION INTRAVENOUS at 19:53

## 2025-05-15 RX ADMIN — GADOBUTROL 7 ML: 604.72 INJECTION INTRAVENOUS at 20:01

## 2025-05-15 ASSESSMENT — ACTIVITIES OF DAILY LIVING (ADL)
ADLS_ACUITY_SCORE: 41

## 2025-05-15 ASSESSMENT — ENCOUNTER SYMPTOMS
PSYCHIATRIC NEGATIVE: 1
GASTROINTESTINAL NEGATIVE: 1
EYES NEGATIVE: 1
ALLERGIC/IMMUNOLOGIC NEGATIVE: 1
HEMATOLOGIC/LYMPHATIC NEGATIVE: 1
MUSCULOSKELETAL NEGATIVE: 1
CARDIOVASCULAR NEGATIVE: 1
ENDOCRINE NEGATIVE: 1
CONSTITUTIONAL NEGATIVE: 1
NUMBNESS: 1
RESPIRATORY NEGATIVE: 1

## 2025-05-15 ASSESSMENT — COLUMBIA-SUICIDE SEVERITY RATING SCALE - C-SSRS
1. IN THE PAST MONTH, HAVE YOU WISHED YOU WERE DEAD OR WISHED YOU COULD GO TO SLEEP AND NOT WAKE UP?: NO
6. HAVE YOU EVER DONE ANYTHING, STARTED TO DO ANYTHING, OR PREPARED TO DO ANYTHING TO END YOUR LIFE?: NO
2. HAVE YOU ACTUALLY HAD ANY THOUGHTS OF KILLING YOURSELF IN THE PAST MONTH?: NO

## 2025-05-15 NOTE — TELEPHONE ENCOUNTER
Nurse Triage SBAR    Is this a 2nd Level Triage? YES, LICENSED PRACTITIONER REVIEW IS REQUIRED    Situation: Patient called  to see what he should do about his symptoms.     Background: Patient stated he has been having issues with dizziness for the last week, he thought that he was dehydrated and had increased his water intake but it did not help.    Assessment: About 10-15 minutes ago patient had a right sided numbness and tingling that lasted about 15-20 seconds. From his head all the way to his toes. He was alone and was not able to see if he had facial drooping but stated it felt like his face was drooping slightly. He stated he was reaching out when this happened. His right leg now has a little bit of soreness, he isn't sure if he has any weakness but said he thinks he felt a little weakness. No speech issues noted. No headaches. He is going to have someone take him to the ED.     Given his week long dizziness that is not improved with fluid intake. RN is concerned for possible TIA or other neurologic issue.   Protocol Recommended Disposition:   Call ADS/Go to ED/UCC Now (Or To Office with PCP Approval)    Recommendation: patient will have someone take him to the ED. reviewed s/s to call 911. Patient understands.     Routed to provider    Does the patient meet one of the following criteria for ADS visit consideration? 16+ years old, with an [facility] PCP     TIP  Providers, please consider if this condition is appropriate for management at one of our Acute and Diagnostic Services sites.     If patient is a good candidate, please use dotphrase <dot>triageresponse and select Refer to ADS to document.      Reason for Disposition   Neurologic deficit that was brief (now gone), ANY of the following: * Weakness of the face, arm, or leg on one side of the body * Numbness of the face, arm, or leg on one side of the body * Loss of speech or garbled speech    Protocols used: Neurologic Deficit-A-[place]    Bertha Payne RN on  5/15/2025 at 5:20 PM

## 2025-05-15 NOTE — LETTER
May 15, 2025      To Whom It May Concern:      Pio Watson was seen in our Emergency Department today, 05/15/25.  Please excuse him from missing work due to needing to seek care.  If there is symptom recurrence or new concerns he may need to return to be reexamined.  His work note is valid until 5/19/25      Sincerely,            Anthony Major MD  Electronically signed

## 2025-05-15 NOTE — ED PROVIDER NOTES
History     Chief Complaint   Patient presents with    Numbness     Pt presents with concerns of right sided numbness, sudden onset at 1630 tonight. 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 It lasted approx 20 seconds. Pt denies any prior similar symptoms.      HPI  Pio Watson is a 25 year old male who presents for evaluation with report of right-sided numbness    Reviewed the medical record.  Visit and triage contact prior to ED presentation for care.  Patient arrived by car with his girlfriend Anton reported that earlier this afternoon around 4:30 PM while he was at work reaching for an item while working on a machine he developed sudden onset of numbness involving the face, arm and leg.  Patient is right-hand dominant.  The episode lasted about  20 seconds.  Patient reports there is no sensation of tingling or true weakness.  He reported no visual change no headache no speech difficulty patient reported that he has had no prior episodes of similar episodes and no personal or family history of stroke or seizure or brain malignancy..  Patient reported he spoke with his mother who encouraged him to come in to be evaluated.  His girlfriend who picked him up from work reported that he ambulating normally and that his speech has been normal patient has not had any recurrence of symptoms.    Allergies:  No Known Allergies    Problem List:    Patient Active Problem List    Diagnosis Date Noted    Nonorganic enuresis 10/06/2008     Priority: Medium    Other behavioral problems 08/07/2007     Priority: Medium     Problem list name updated by automated process. 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 Provider to review          Past Medical History:    Past Medical History:   Diagnosis Date    Depressive disorder     Unspecified hearing loss        Past Surgical History:    No past surgical  "history on file.    Family History:    Family History   Problem Relation Age of Onset    Depression Mother     Gastrointestinal Disease Mother         Crohn's Disease    Alcohol/Drug Father     Unknown/Adopted Father     Unknown/Adopted Maternal Grandfather     Musculoskeletal Disorder Paternal Grandmother         fibromyalgia    Diabetes Paternal Grandmother         hypoglycemia       Social History:  Marital Status:  Single [1]  Social History     Tobacco Use    Smoking status: Every Day     Current packs/day: 0.00     Types: Cigarettes    Smokeless tobacco: Never    Tobacco comments:     outside, car   Substance Use Topics    Alcohol use: Not Currently    Drug use: No        Medications:    cephALEXin (KEFLEX) 500 MG capsule          Review of Systems   Constitutional: Negative.    HENT: Negative.     Eyes: Negative.    Respiratory: Negative.     Cardiovascular: Negative.    Gastrointestinal: Negative.    Endocrine: Negative.    Genitourinary: Negative.    Musculoskeletal: Negative.    Skin: Negative.    Allergic/Immunologic: Negative.    Neurological:  Positive for numbness.   Hematological: Negative.    Psychiatric/Behavioral: Negative.     All other systems reviewed and are negative.      Physical Exam   BP: (!) 138/100  Pulse: 85  Temp: 98.4  F (36.9  C)  Resp: 13  Height: 175.3 cm (5' 9\")  Weight: 72.1 kg (159 lb)  SpO2: 99 %      Physical Exam  Constitutional:       General: He is not in acute distress.     Appearance: He is not ill-appearing, toxic-appearing or diaphoretic.   HENT:      Head: Normocephalic and atraumatic.      Nose: Nose normal.      Mouth/Throat:      Mouth: Mucous membranes are moist.   Eyes:      Extraocular Movements: Extraocular movements intact.      Pupils: Pupils are equal, round, and reactive to light.   Cardiovascular:      Rate and Rhythm: Normal rate and regular rhythm.   Pulmonary:      Effort: Pulmonary effort is normal. No respiratory distress.      Breath sounds: Normal " breath sounds. No wheezing or rales.   Musculoskeletal:         General: No swelling, tenderness, deformity or signs of injury.      Cervical back: Normal range of motion and neck supple.      Right lower leg: No edema.      Left lower leg: No edema.   Skin:     Capillary Refill: Capillary refill takes less than 2 seconds.      Coloration: Skin is not jaundiced or pale.      Findings: No bruising, erythema, lesion or rash.   Neurological:      General: No focal deficit present.      Mental Status: He is alert and oriented to person, place, and time.      Cranial Nerves: No cranial nerve deficit.      Sensory: No sensory deficit.      Motor: No weakness.      Coordination: Coordination normal.      Gait: Gait normal.      Deep Tendon Reflexes: Reflexes normal.   Psychiatric:         Mood and Affect: Mood normal.         Behavior: Behavior normal.         Thought Content: Thought content normal.         Judgment: Judgment normal.         ED Course        Procedures              EKG Interpretation:      Interpreted by Harjeet Major MD  Time reviewed: 1944  Symptoms at time of EKG: None   Rhythm: normal sinus   Rate: Normal  Axis: Normal  Ectopy: none  Conduction: normal  ST Segments/ T Waves: Non-specific ST-T wave changes  Q Waves: nonspecific  Comparison to prior: No old EKG available    Clinical Impression: no acute changes    Critical Care time:  none     None        ED medications:  Medications   sodium chloride 0.9% BOLUS 500 mL (500 mLs Intravenous $New Bag 5/15/25 1953)   iopamidol (ISOVUE-370) solution 67 mL (67 mLs Intravenous $Given 5/15/25 2017)   sodium chloride 0.9 % bag for CT scan flush (100 mLs As instructed $Given 5/15/25 2018)   gadobutrol (GADAVIST) injection 7 mL (7 mLs Intravenous $Given 5/15/25 2001)        ED Vitals:  Vitals:    05/15/25 1850 05/15/25 1859   BP: (!) 138/100    Pulse: 85    Resp: 13    Temp:  98.4  F (36.9  C)   SpO2: 99%    Weight:  72.1 kg (159 lb)   Height:  1.753 m  "(5' 9\")        ED labs and imaging:  Results for orders placed or performed during the hospital encounter of 05/15/25   CT Head w/o Contrast     Status: None    Narrative    EXAM: CT HEAD W/O CONTRAST, CTA HEAD NECK W CONTRAST  LOCATION: [facility] [place]  DATE: 5/15/2025    INDICATION: Acute neuro deficit, stroke, TIA suspected. Acute right-sided numbness (face, arm and leg) at 4.30 pm., resolved. Evaluate for acute intracranial process.  COMPARISON: MRI brain performed same day.  CONTRAST: 67 mL Isovue-370.  TECHNIQUE: Head and neck CT angiogram with IV contrast. Noncontrast head CT followed by axial helical CT images of the head and neck vessels obtained during the arterial phase of intravenous contrast administration. Axial 2D reconstructed images and   multiplanar 3D MIP reconstructed images of the head and neck vessels were performed by the technologist. Dose reduction techniques were used. All stenosis measurements made according to NASCET criteria unless otherwise specified.    FINDINGS:   NONCONTRAST HEAD CT:   INTRACRANIAL CONTENTS: No finding for intracranial hemorrhage, mass, or acute infarct. Ventricles, sulci, and cisterns are normal in appearance. Normal gray-white matter differentiation. No mass effect or midline shift.    Cerebellar tonsils are normally positioned. Sella is unremarkable for technique. Corpus callosum is normally formed.    VISUALIZED ORBITS/SINUSES/MASTOIDS: No intraorbital abnormality. Mild left sphenoid sinus mucosal thickening.    BONES/SOFT TISSUES: Calvarium is intact, without suspicious lytic or blastic foci.    HEAD CTA:  ANTERIOR CIRCULATION: Intracranial internal carotid arteries are normal in appearance. Duplicated left middle cerebral artery versus bifurcation and its origin. Right middle cerebral artery is normal in appearance. A1 segment of the right anterior   cerebral artery is diminutive with flow to the A2 and more distal segments supplied via " large-caliber left A1 segment.    POSTERIOR CIRCULATION: Intracranial vertebral arteries, basilar artery, and posterior cerebral arteries are normal in appearance. Vertebral arteries are balanced. Posterior communicating artery is not identified.     DURAL VENOUS SINUSES: No findings for dural venous sinus thrombosis.    NECK CTA:  RIGHT CAROTID: No measurable stenosis or dissection.    LEFT CAROTID: No measurable stenosis or dissection.    VERTEBRAL ARTERIES: No focal stenosis or dissection. Balanced vertebral arteries.    AORTIC ARCH: Visualized aortic arch and proximal great vessels are unremarkable.    NONVASCULAR STRUCTURES: Visualized lung apices are clear.      Impression    IMPRESSION:   HEAD CT:  1.  No finding for intracranial hemorrhage, mass, or acute infarct.    HEAD CTA:  1.  Developmental variation of [place] of [place] without vascular cutoff, aneurysm, or flow-limiting stenosis.    NECK CTA:  1.  No significant stenosis either cervical carotid or vertebral system. No findings for dissection.     CTA Head Neck with Contrast     Status: None    Narrative    EXAM: CT HEAD W/O CONTRAST, CTA HEAD NECK W CONTRAST  LOCATION: [facility]  DATE: 5/15/2025    INDICATION: Acute neuro deficit, stroke, TIA suspected. Acute right-sided numbness (face, arm and leg) at 4.30 pm., resolved. Evaluate for acute intracranial process.  COMPARISON: MRI brain performed same day.  CONTRAST: 67 mL Isovue-370.  TECHNIQUE: Head and neck CT angiogram with IV contrast. Noncontrast head CT followed by axial helical CT images of the head and neck vessels obtained during the arterial phase of intravenous contrast administration. Axial 2D reconstructed images and   multiplanar 3D MIP reconstructed images of the head and neck vessels were performed by the technologist. Dose reduction techniques were used. All stenosis measurements made according to NASCET criteria unless otherwise specified.    FINDINGS:    NONCONTRAST HEAD CT:   INTRACRANIAL CONTENTS: No finding for intracranial hemorrhage, mass, or acute infarct. Ventricles, sulci, and cisterns are normal in appearance. Normal gray-white matter differentiation. No mass effect or midline shift.    Cerebellar tonsils are normally positioned. Sella is unremarkable for technique. Corpus callosum is normally formed.    VISUALIZED ORBITS/SINUSES/MASTOIDS: No intraorbital abnormality. Mild left sphenoid sinus mucosal thickening.    BONES/SOFT TISSUES: Calvarium is intact, without suspicious lytic or blastic foci.    HEAD CTA:  ANTERIOR CIRCULATION: Intracranial internal carotid arteries are normal in appearance. Duplicated left middle cerebral artery versus bifurcation and its origin. Right middle cerebral artery is normal in appearance. A1 segment of the right anterior   cerebral artery is diminutive with flow to the A2 and more distal segments supplied via large-caliber left A1 segment.    POSTERIOR CIRCULATION: Intracranial vertebral arteries, basilar artery, and posterior cerebral arteries are normal in appearance. Vertebral arteries are balanced. Posterior communicating artery is not identified.     DURAL VENOUS SINUSES: No findings for dural venous sinus thrombosis.    NECK CTA:  RIGHT CAROTID: No measurable stenosis or dissection.    LEFT CAROTID: No measurable stenosis or dissection.    VERTEBRAL ARTERIES: No focal stenosis or dissection. Balanced vertebral arteries.    AORTIC ARCH: Visualized aortic arch and proximal great vessels are unremarkable.    NONVASCULAR STRUCTURES: Visualized lung apices are clear.      Impression    IMPRESSION:   HEAD CT:  1.  No finding for intracranial hemorrhage, mass, or acute infarct.    HEAD CTA:  1.  Developmental variation of [place] of [place] without vascular cutoff, aneurysm, or flow-limiting stenosis.    NECK CTA:  1.  No significant stenosis either cervical carotid or vertebral system. No findings for dissection.     MR  Brain w/o & w Contrast     Status: None    Narrative    EXAM: MR BRAIN W/O and W CONTRAST  LOCATION: [facility]  DATE: 5/15/2025    INDICATION: Acute neuro deficit, stroke suspected. Acute right-sided numbness (Face, arm and leg) at 4.30pm. Resolved. Evaluate for acute intracranial and/or vascular process.  COMPARISON: CTA [place] of [place] and neck performed same day.  CONTRAST: gadavist 7 mL  TECHNIQUE: Routine multiplanar multisequence head MRI without and with intravenous contrast.    FINDINGS:  INTRACRANIAL CONTENTS: There are no areas of abnormally restricted diffusion to suggest acute or subacute infarct. There are no areas of abnormal parenchymal susceptibility or abnormal enhancement.    Ventricles are normal in size. Normal gray-white matter differentiation. No mass effect or midline shift.    Cerebellar tonsils are normally positioned. Visualized upper cervical spinal cord, sella, and corpus callosum are normal in appearance. Major skull base vascular flow-voids are preserved.    OSSEOUS STRUCTURES/SOFT TISSUES: Visualized marrow space is unremarkable.     ORBITS: No abnormality accounting for technique.     SINUSES/MASTOIDS: Mild ethmoid, left sphenoid, and right greater than left maxillary sinus mucosal thickening. No air-fluid levels. Middle ear cavities and mastoid air cells are essentially clear.       Impression    IMPRESSION:  1.  No finding for intracranial hemorrhage, mass, or acute infarct.    2.  Paranasal sinus mucosal thickening. No air-fluid levels.   Glucose by meter     Status: Normal   Result Value Ref Range    GLUCOSE BY METER POCT 96 70 - 99 mg/dL   Basic metabolic panel     Status: Normal   Result Value Ref Range    Sodium 142 135 - 145 mmol/L    Potassium 4.0 3.4 - 5.3 mmol/L    Chloride 105 98 - 107 mmol/L    Carbon Dioxide (CO2) 25 22 - 29 mmol/L    Anion Gap 12 7 - 15 mmol/L    Urea Nitrogen 9.9 6.0 - 20.0 mg/dL    Creatinine 0.92 0.67 - 1.17 mg/dL    GFR  Estimate >90 >60 mL/min/1.73m2    Calcium 9.6 8.8 - 10.4 mg/dL    Glucose 91 70 - 99 mg/dL   INR     Status: Normal   Result Value Ref Range    INR 1.05 0.85 - 1.15    PT 13.6 11.8 - 14.8 Seconds   Partial thromboplastin time     Status: Normal   Result Value Ref Range    aPTT 27 22 - 38 Seconds   Troponin T, High Sensitivity     Status: Normal   Result Value Ref Range    Troponin T, High Sensitivity <6 <=22 ng/L   CBC with platelets and differential     Status: None   Result Value Ref Range    WBC Count 6.7 4.0 - 11.0 10e3/uL    RBC Count 4.63 4.40 - 5.90 10e6/uL    Hemoglobin 14.8 13.3 - 17.7 g/dL    Hematocrit 40.8 40.0 - 53.0 %    MCV 88 78 - 100 fL    MCH 32.0 26.5 - 33.0 pg    MCHC 36.3 31.5 - 36.5 g/dL    RDW 11.7 10.0 - 15.0 %    Platelet Count 236 150 - 450 10e3/uL    % Neutrophils 65 %    % Lymphocytes 24 %    % Monocytes 7 %    % Eosinophils 2 %    % Basophils 1 %    % Immature Granulocytes 0 %    NRBCs per 100 WBC 0 <1 /100    Absolute Neutrophils 4.4 1.6 - 8.3 10e3/uL    Absolute Lymphocytes 1.6 0.8 - 5.3 10e3/uL    Absolute Monocytes 0.5 0.0 - 1.3 10e3/uL    Absolute Eosinophils 0.1 0.0 - 0.7 10e3/uL    Absolute Basophils 0.1 0.0 - 0.2 10e3/uL    Absolute Immature Granulocytes 0.0 <=0.4 10e3/uL    Absolute NRBCs 0.0 10e3/uL   EKG 12-lead, tracing only     Status: None (Preliminary result)   Result Value Ref Range    Systolic Blood Pressure  mmHg    Diastolic Blood Pressure  mmHg    Ventricular Rate 76 BPM    Atrial Rate 76 BPM    KY Interval 176 ms    QRS Duration 96 ms     ms    QTc 402 ms    P Axis 47 degrees    R AXIS 45 degrees    T Axis 43 degrees    Interpretation ECG       Sinus rhythm  Normal ECG  No previous ECGs available     CBC with platelets differential     Status: None    Narrative    The following orders were created for panel order CBC with platelets differential.  Procedure                               Abnormality         Status                     ---------                                -----------         ------                     CBC with platelets and ...[[id number]]                      Final result                 Please view results for these tests on the individual orders.   CBC with Platelets & Differential *Canceled*     Status: None ()    Narrative    The following orders were created for panel order CBC with Platelets & Differential.  Procedure                               Abnormality         Status                     ---------                               -----------         ------                       Please view results for these tests on the individual orders.      Assessments & Plan (with Medical Decision Making)   Assessment Summary and Clinical impression: 25-year-old male right hand dominant who presented with report of right-sided numbness involving the (face, arm and leg). Symptoms seem to progress and he described a sensation of warmth without tingling or weakness.  The episode lasted about 20 seconds  and was referred from urgent care for further evaluation and care.  No prior episodes of similar symptoms no personal or family history of stroke or seizure.  There is no true prodrome.  There is no accompanying trauma.  He occurred while he was at work reaching for an item while working on a machine.  No back pain no chest pain no neck pain.  Patient was captured to be afebrile.  Blood pressure was 132/100.  He was 99% on room air.  GCS 15 on arrival without focal neurologic deficits.  He arrived by car with his partner who reported that his speech was normal and his gait was normal given absence of active symptoms with brief bleeding episode there was no code stroke activation.  Given unilateral deficit in left face and extremities do imaging was completed to ensure symptoms are not due to an acute cerebrovascular process. His workup revealed no acute findings on neuroimaging including reassuring head CT, CTA head and neck, and brain MRI  He was discharged  symptoms around uncertain cause with low threshold to return to be reexamined and plan for outpatient follow-up.    ED course and plan:  Reviewed the medical record.  Reviewed the nurse triage note and urgent care notes prior to ED presentation for care.  EKG revealed no acute ischemia or arrhythmia with normal sinus rhythm with no old EKG for comparison.  Blood work was reassuring today include normal metabolic profile and hemogram normal troponin. Neuroimaging including head CT, CTA head and neck, and brain MRI revealed no acute neurologic process.  After period of observation and care with no recurrence of symptoms and reassuring neuroimaging patient was discharged symptoms of uncertain cause.  He was given precautions for possible partial seizure.  Low threshold to return if there is recurrence of symptoms and further close follow-up with primary care provider if needed. Patient and his partner   Present with him during his ED course of care expressed understanding comfort with the plan of care.      Disclaimer: This note consists of symbols derived from keyboarding, dictation and/or voice recognition software. As a result, there may be errors in the script that have gone undetected. Please consider this when interpreting information found in this chart.   I have reviewed the nursing notes.    I have reviewed the findings, diagnosis, plan and need for follow up with the patient.           Medical Decision Making  The patient's presentation was of high complexity (right sided numbness, involving face, arm and leg lasting 20 seconds prior to ED arrival,).    The patient's evaluation involved:  ordering and/or review of 3+ test(s) in this encounter (frequent vital signs with neurologic examination, blood work, neuroimaging with head CT, CTA head and neck, and brain MRI)    The patient's management necessitated high risk (symptoms of uncertain cause, low threshold to return to be evaluated for recurrence of symptoms  and outpatient follow).        New Prescriptions    No medications on file       Final diagnoses:   Numbness - Episode of numbness involving the right face, right arm, and right leg lasting 20 seconds around 4:30 PM while at work       5/15/2025   [facility] EMERGENCY DEPT       Harjeet Major MD  05/16/25 0128

## 2025-05-15 NOTE — ED TRIAGE NOTES
"Pt presents with concerns of right sided numbness, sudden onset at 1630 tonight. 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 It lasted approx 20 seconds. Pt denies any prior similar symptoms. Pt had pain for approx 30 min after the incident that \"felt like exhaustion\", like a soreness.      Triage Assessment (Adult)       Row Name 05/15/25 7045          Triage Assessment    Airway WDL WDL        Respiratory WDL    Respiratory WDL WDL        Skin Circulation/Temperature WDL    Skin Circulation/Temperature WDL WDL        Cardiac WDL    Cardiac WDL WDL        Peripheral/Neurovascular WDL    Peripheral Neurovascular WDL WDL        Cognitive/Neuro/Behavioral WDL    Cognitive/Neuro/Behavioral WDL WDL                     "

## 2025-05-16 NOTE — DISCHARGE INSTRUCTIONS
1) Your evaluation today was reassuring with no evidence to suggest a stroke, brain tumor or malignancy, or evidence that your symptoms are related to a seizure event.  Your imaging and blood work was reassuring with no evidence of an emergency condition     2) Your evaluation today did not reveal the exact cause of your symptoms, we have discussed follow-up care including if there is recurrence of episodes and precautions with driving to ensure symptoms are related to a partial seizure event although your workup was reassuring today if there are new concerns or recurrence of symptoms you should return to be reevaluated.

## 2025-07-12 ENCOUNTER — HEALTH MAINTENANCE LETTER (OUTPATIENT)
Age: 25
End: 2025-07-12